# Patient Record
Sex: MALE | Race: WHITE | NOT HISPANIC OR LATINO | Employment: FULL TIME | URBAN - NONMETROPOLITAN AREA
[De-identification: names, ages, dates, MRNs, and addresses within clinical notes are randomized per-mention and may not be internally consistent; named-entity substitution may affect disease eponyms.]

---

## 2023-07-03 ENCOUNTER — OFFICE VISIT (OUTPATIENT)
Dept: INTERNAL MEDICINE | Facility: OTHER | Age: 48
End: 2023-07-03
Payer: COMMERCIAL

## 2023-07-03 ENCOUNTER — PATIENT OUTREACH (OUTPATIENT)
Dept: CARE COORDINATION | Facility: CLINIC | Age: 48
End: 2023-07-03

## 2023-07-03 VITALS
TEMPERATURE: 97.7 F | HEART RATE: 66 BPM | OXYGEN SATURATION: 96 % | DIASTOLIC BLOOD PRESSURE: 82 MMHG | RESPIRATION RATE: 24 BRPM | SYSTOLIC BLOOD PRESSURE: 136 MMHG | WEIGHT: 207.38 LBS | HEIGHT: 71 IN | BODY MASS INDEX: 29.03 KG/M2

## 2023-07-03 DIAGNOSIS — Z00.00 ENCOUNTER FOR MEDICAL EXAMINATION TO ESTABLISH CARE: Primary | ICD-10-CM

## 2023-07-03 DIAGNOSIS — R09.81 NASAL CONGESTION: ICD-10-CM

## 2023-07-03 DIAGNOSIS — E78.2 MIXED HYPERLIPIDEMIA: ICD-10-CM

## 2023-07-03 LAB
ALBUMIN SERPL BCG-MCNC: 4.3 G/DL (ref 3.5–5.2)
ALP SERPL-CCNC: 73 U/L (ref 40–129)
ALT SERPL W P-5'-P-CCNC: 25 U/L (ref 0–70)
ANION GAP SERPL CALCULATED.3IONS-SCNC: 9 MMOL/L (ref 7–15)
AST SERPL W P-5'-P-CCNC: 20 U/L (ref 0–45)
BASOPHILS # BLD AUTO: 0.1 10E3/UL (ref 0–0.2)
BASOPHILS NFR BLD AUTO: 1 %
BILIRUB SERPL-MCNC: 0.4 MG/DL
BUN SERPL-MCNC: 13.7 MG/DL (ref 6–20)
CALCIUM SERPL-MCNC: 9.3 MG/DL (ref 8.6–10)
CHLORIDE SERPL-SCNC: 107 MMOL/L (ref 98–107)
CHOLEST SERPL-MCNC: 214 MG/DL
CREAT SERPL-MCNC: 0.83 MG/DL (ref 0.67–1.17)
DEPRECATED HCO3 PLAS-SCNC: 25 MMOL/L (ref 22–29)
EOSINOPHIL # BLD AUTO: 0.3 10E3/UL (ref 0–0.7)
EOSINOPHIL NFR BLD AUTO: 5 %
ERYTHROCYTE [DISTWIDTH] IN BLOOD BY AUTOMATED COUNT: 12.9 % (ref 10–15)
GFR SERPL CREATININE-BSD FRML MDRD: >90 ML/MIN/1.73M2
GLUCOSE SERPL-MCNC: 111 MG/DL (ref 70–99)
HCT VFR BLD AUTO: 44 % (ref 40–53)
HDLC SERPL-MCNC: 47 MG/DL
HGB BLD-MCNC: 14.9 G/DL (ref 13.3–17.7)
IMM GRANULOCYTES # BLD: 0 10E3/UL
IMM GRANULOCYTES NFR BLD: 1 %
LDLC SERPL CALC-MCNC: 147 MG/DL
LYMPHOCYTES # BLD AUTO: 1.8 10E3/UL (ref 0.8–5.3)
LYMPHOCYTES NFR BLD AUTO: 29 %
MCH RBC QN AUTO: 31.2 PG (ref 26.5–33)
MCHC RBC AUTO-ENTMCNC: 33.9 G/DL (ref 31.5–36.5)
MCV RBC AUTO: 92 FL (ref 78–100)
MONOCYTES # BLD AUTO: 0.5 10E3/UL (ref 0–1.3)
MONOCYTES NFR BLD AUTO: 8 %
NEUTROPHILS # BLD AUTO: 3.6 10E3/UL (ref 1.6–8.3)
NEUTROPHILS NFR BLD AUTO: 56 %
NONHDLC SERPL-MCNC: 167 MG/DL
NRBC # BLD AUTO: 0 10E3/UL
NRBC BLD AUTO-RTO: 0 /100
PLATELET # BLD AUTO: 190 10E3/UL (ref 150–450)
POTASSIUM SERPL-SCNC: 4.7 MMOL/L (ref 3.4–5.3)
PROT SERPL-MCNC: 6.5 G/DL (ref 6.4–8.3)
RBC # BLD AUTO: 4.78 10E6/UL (ref 4.4–5.9)
SODIUM SERPL-SCNC: 141 MMOL/L (ref 136–145)
TRIGL SERPL-MCNC: 102 MG/DL
WBC # BLD AUTO: 6.2 10E3/UL (ref 4–11)

## 2023-07-03 PROCEDURE — 87389 HIV-1 AG W/HIV-1&-2 AB AG IA: CPT | Mod: ZL

## 2023-07-03 PROCEDURE — 36415 COLL VENOUS BLD VENIPUNCTURE: CPT | Mod: ZL

## 2023-07-03 PROCEDURE — 86803 HEPATITIS C AB TEST: CPT | Mod: ZL

## 2023-07-03 PROCEDURE — 80061 LIPID PANEL: CPT | Mod: ZL

## 2023-07-03 PROCEDURE — 85014 HEMATOCRIT: CPT | Mod: ZL

## 2023-07-03 PROCEDURE — 99204 OFFICE O/P NEW MOD 45 MIN: CPT

## 2023-07-03 PROCEDURE — 80053 COMPREHEN METABOLIC PANEL: CPT | Mod: ZL

## 2023-07-03 RX ORDER — ATORVASTATIN CALCIUM 20 MG/1
20 TABLET, FILM COATED ORAL DAILY
Qty: 90 TABLET | Refills: 0 | Status: SHIPPED | OUTPATIENT
Start: 2023-07-03

## 2023-07-03 ASSESSMENT — ENCOUNTER SYMPTOMS
FEVER: 0
SINUS PAIN: 0
CHILLS: 0
SORE THROAT: 0
NERVOUS/ANXIOUS: 1
TROUBLE SWALLOWING: 0
RHINORRHEA: 1
SLEEP DISTURBANCE: 1
PALPITATIONS: 0
DIZZINESS: 0
SHORTNESS OF BREATH: 0
COUGH: 0
SINUS PRESSURE: 0

## 2023-07-03 ASSESSMENT — PATIENT HEALTH QUESTIONNAIRE - PHQ9
SUM OF ALL RESPONSES TO PHQ QUESTIONS 1-9: 16
SUM OF ALL RESPONSES TO PHQ QUESTIONS 1-9: 16

## 2023-07-03 ASSESSMENT — PAIN SCALES - GENERAL: PAINLEVEL: NO PAIN (0)

## 2023-07-03 NOTE — PROGRESS NOTES
Assessment & Plan   Andrade Persaud is a 47 year old presenting for the following health issues:      ICD-10-CM    1. Encounter for medical examination to establish care  Z00.00 Primary Care - Care Coordination Referral     CBC and Differential     Comprehensive Metabolic Panel     Lipid Panel     Hepatitis C Screen Reflex to HCV RNA Quant and Genotype     HIV Antigen Antibody Combo     HIV Antigen Antibody Combo     Hepatitis C Screen Reflex to HCV RNA Quant and Genotype     Lipid Panel     Comprehensive Metabolic Panel     CBC and Differential      2. Nasal congestion  R09.81 Adult ENT  Referral      3. Mixed hyperlipidemia  E78.2 atorvastatin (LIPITOR) 20 MG tablet          MIXED HYPERLIPIDEMIA.  Ongoing. LDL is at goal: No. Triglycerides are at goal: Yes.  Hopefully lifestyle modifications will improve cholesterol levels, otherwise we will need to consider additional medication dose adjustments or medication changes.  Started patient on 20 mg of Lipitor.-Patient was called and notified of the lab results and medication.  Medication side effects reported:N/A    Recent Labs   Lab Test 07/03/23  1018   CHOL 214*   HDL 47   *   TRIG 102        Nasal Congestion: Instructed patient to continue with Flonase nasal spray, Claritin or Zyrtec daily.  Referral been placed to ENT-nasal turbinates are enlarged and swollen with rhinorrhea.  Possible nasal deviation is suspected.  He denies any postnasal drainage, chronic cough, history of allergies.    CBC and CMP are insignificant-recommended to patient to follow-up in 4 to 6 weeks for a yearly physical and to recheck cholesterol levels.    No follow-ups on file.    TORO Deleon CNP  [facility]      Nila Zafar is a 47 year old, presenting for the following health issues:    Patient presents to clinic with ongoing nasal congestion over the last 2 years.  He has been taking multiple different types of nasal sprays,  over-the-counter allergy medications, anticholinergics.  He states that he typically doctors in [place], multiple other places, unable to report exactly where he used to doctor.  He does state that he has a history of cancer, unsure what type of cancer but shows me an incision on his left chest area.  He states that the surgeon said he removed most of the cancer but may need to attempt to get the rest of the cancer otherwise he will need to proceed with chemotherapy.  Patient reports that he forgot to bring in his past medical records.        congestion for years (Nasal and sinus congestion for years since COVID shot. Difficulty sleeping triplett to congestion. Had to put dog down this weekend and is feeling down   Vidhi Allen LPN on 7/3/2023 at 9:36 AM//)  History of Present Illness       Reason for visit:  Congestion, depression    He eats 0-1 servings of fruits and vegetables daily.He consumes 1 sweetened beverage(s) daily.He exercises with enough effort to increase his heart rate 9 or less minutes per day.  He exercises with enough effort to increase his heart rate 3 or less days per week.   He is taking medications regularly.    Today's PHQ-9         PHQ-9 Total Score: 16    PHQ-9 Q9 Thoughts of better off dead/self-harm past 2 weeks :   Not at all           Allergies  Onset/Duration: couple years  Symptoms:   Nasal congestion: YES  Sneezing: No  Red, itchy eyes: No  Progression of Symptoms: same constant  Accompanying Signs & Symptoms:  Cough:No  Wheezing: No  Rash: No  Sinus/facial pain: No  History:   Is it seasonal: during any time of the year   History of Asthma: No  Has allergy testing been done: No  Precipitating factors:   None  Alleviating factors:  None  Therapies tried and outcome: None    Depression Followup    How are you doing with your depression since your last visit? Worsened- due to his dog dying 2 days ago    Are you having other symptoms that might be associated with depression? Yes:   "feeling down since he put his dog down two days ago    Have you had a significant life event?  Grief or Loss     Are you feeling anxious or having panic attacks?   Yes:  at times    Do you have any concerns with your use of alcohol or other drugs? No    Social History     Tobacco Use     Smoking status: Former     Types: Cigarettes, Other     Smokeless tobacco: Current     Tobacco comments:     Smokes marijuana         7/3/2023     9:38 AM   PHQ   PHQ-9 Total Score 16   Q9: Thoughts of better off dead/self-harm past 2 weeks Not at all             Review of Systems   Constitutional: Negative for chills and fever.   HENT: Positive for congestion and rhinorrhea. Negative for ear pain, sinus pressure, sinus pain, sneezing, sore throat, tinnitus and trouble swallowing.    Respiratory: Negative for cough and shortness of breath.    Cardiovascular: Negative for chest pain and palpitations.   Neurological: Negative for dizziness.   Psychiatric/Behavioral: Positive for sleep disturbance. Negative for self-injury and suicidal ideas. The patient is nervous/anxious.    All other systems reviewed and are negative.           Objective    /82   Pulse 66   Temp 97.7  F (36.5  C)   Resp 24   Ht 1.803 m (5' 11\")   Wt 94.1 kg (207 lb 6 oz)   SpO2 96%   BMI 28.92 kg/m    Body mass index is 28.92 kg/m .  Physical Exam  Constitutional:       General: He is not in acute distress.     Appearance: He is well-developed.   HENT:      Head: Normocephalic and atraumatic.      Right Ear: Tympanic membrane, ear canal and external ear normal. There is no impacted cerumen.      Left Ear: Tympanic membrane, ear canal and external ear normal. There is no impacted cerumen.      Nose: Rhinorrhea present.      Right Turbinates: Enlarged and swollen.      Left Turbinates: Enlarged and swollen.      Right Sinus: No maxillary sinus tenderness or frontal sinus tenderness.      Left Sinus: No maxillary sinus tenderness or frontal sinus " tenderness.      Mouth/Throat:      Pharynx: No oropharyngeal exudate.   Eyes:      General: No scleral icterus.     Conjunctiva/sclera: Conjunctivae normal.      Pupils: Pupils are equal, round, and reactive to light.   Neck:      Thyroid: No thyromegaly.   Cardiovascular:      Rate and Rhythm: Normal rate and regular rhythm.      Heart sounds: No murmur heard.  Pulmonary:      Effort: Pulmonary effort is normal. No respiratory distress.      Breath sounds: Normal breath sounds. No wheezing.   Musculoskeletal:         General: No tenderness or deformity. Normal range of motion.      Cervical back: Normal range of motion and neck supple.   Skin:     General: Skin is warm and dry.      Findings: No rash.   Neurological:      Mental Status: He is alert and oriented to person, place, and time.      Cranial Nerves: No cranial nerve deficit.      Coordination: Coordination normal.   Psychiatric:         Behavior: Behavior normal.         Thought Content: Thought content normal.         Judgment: Judgment normal.        Results for orders placed or performed in visit on 07/03/23   Lipid Panel     Status: Abnormal   Result Value Ref Range    Cholesterol 214 (H) <200 mg/dL    Triglycerides 102 <150 mg/dL    Direct Measure HDL 47 >=40 mg/dL    LDL Cholesterol Calculated 147 (H) <=100 mg/dL    Non HDL Cholesterol 167 (H) <130 mg/dL    Narrative    Cholesterol  Desirable:  <200 mg/dL    Triglycerides  Normal:  Less than 150 mg/dL  Borderline High:  150-199 mg/dL  High:  200-499 mg/dL  Very High:  Greater than or equal to 500 mg/dL    Direct Measure HDL  Female:  Greater than or equal to 50 mg/dL   Male:  Greater than or equal to 40 mg/dL    LDL Cholesterol  Desirable:  <100mg/dL  Above Desirable:  100-129 mg/dL   Borderline High:  130-159 mg/dL   High:  160-189 mg/dL   Very High:  >= 190 mg/dL    Non HDL Cholesterol  Desirable:  130 mg/dL  Above Desirable:  130-159 mg/dL  Borderline High:  160-189 mg/dL  High:  190-219  mg/dL  Very High:  Greater than or equal to 220 mg/dL   Comprehensive Metabolic Panel     Status: Abnormal   Result Value Ref Range    Sodium 141 136 - 145 mmol/L    Potassium 4.7 3.4 - 5.3 mmol/L    Chloride 107 98 - 107 mmol/L    Carbon Dioxide (CO2) 25 22 - 29 mmol/L    Anion Gap 9 7 - 15 mmol/L    Urea Nitrogen 13.7 6.0 - 20.0 mg/dL    Creatinine 0.83 0.67 - 1.17 mg/dL    Calcium 9.3 8.6 - 10.0 mg/dL    Glucose 111 (H) 70 - 99 mg/dL    Alkaline Phosphatase 73 40 - 129 U/L    AST 20 0 - 45 U/L    ALT 25 0 - 70 U/L    Protein Total 6.5 6.4 - 8.3 g/dL    Albumin 4.3 3.5 - 5.2 g/dL    Bilirubin Total 0.4 <=1.2 mg/dL    GFR Estimate >90 >60 mL/min/1.73m2   CBC with platelets and differential     Status: None   Result Value Ref Range    WBC Count 6.2 4.0 - 11.0 10e3/uL    RBC Count 4.78 4.40 - 5.90 10e6/uL    Hemoglobin 14.9 13.3 - 17.7 g/dL    Hematocrit 44.0 40.0 - 53.0 %    MCV 92 78 - 100 fL    MCH 31.2 26.5 - 33.0 pg    MCHC 33.9 31.5 - 36.5 g/dL    RDW 12.9 10.0 - 15.0 %    Platelet Count 190 150 - 450 10e3/uL    % Neutrophils 56 %    % Lymphocytes 29 %    % Monocytes 8 %    % Eosinophils 5 %    % Basophils 1 %    % Immature Granulocytes 1 %    NRBCs per 100 WBC 0 <1 /100    Absolute Neutrophils 3.6 1.6 - 8.3 10e3/uL    Absolute Lymphocytes 1.8 0.8 - 5.3 10e3/uL    Absolute Monocytes 0.5 0.0 - 1.3 10e3/uL    Absolute Eosinophils 0.3 0.0 - 0.7 10e3/uL    Absolute Basophils 0.1 0.0 - 0.2 10e3/uL    Absolute Immature Granulocytes 0.0 <=0.4 10e3/uL    Absolute NRBCs 0.0 10e3/uL   CBC and Differential     Status: None    Narrative    The following orders were created for panel order CBC and Differential.  Procedure                               Abnormality         Status                     ---------                               -----------         ------                     CBC with platelets and d...[[id number]]                      Final result                 Please view results for these tests on the individual  orders.

## 2023-07-03 NOTE — PROGRESS NOTES
Clinic Care Coordination Contact    Follow Up Progress Note      Assessment: Patient states he is not sleeping between knee and hip pain and a restless mind. States this has been for a few years. Used to take 4 Trazodone but they left him feeling groggy in the morning and he stopped.     He identifies his biggest challenge as being able to find a doctor who will help him. Was seen at [facility] and now here. He believes he still has cancer and that chemo was recommended. 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 He tells me next week he is back in [place] for a 4-week work stint. States that he has his medication bottle and some medical records there and will call to provide this information.     Care Gaps:    Health Maintenance Due   Topic Date Due     YEARLY PREVENTIVE VISIT  Never done     ADVANCE CARE PLANNING  Never done     HEPATITIS B IMMUNIZATION (1 of 3 - 3-dose series) Never done     COLORECTAL CANCER SCREENING  Never done     DTAP/TDAP/TD IMMUNIZATION (1 - Tdap) Never done     COVID-19 Vaccine (2 - Moderna series) 07/07/2022       Intervention/Education provided during outreach: Care coordination introduced and accepted     Plan:   Patient is going back to work at end of this week. Once there he will call me with names of antidepressant and his doctor who worked with him to address the tumor.   Care Coordinator will wait for him to call next week with more information for follow-up.   Anamaria Contreras RN on 7/6/2023 at 3:10 PM    Clinic Care Coordination Contact  [facility]/Voicemail     Clinical Data: Care Coordinator Outreach  Outreach attempted x 1.  Left message on patient's voicemail with call back information and requested return call.  Plan: Care Coordinator will attempt again.   Anamaria Contreras RN on 7/3/2023 at 2:49 PM

## 2023-07-03 NOTE — PATIENT INSTRUCTIONS
We will coordinate a way to get your health records- I would like to follow up after we have more detailed health history on next steps    Please make a follow up appointment once this is completed.       Referral has been placed with ENT for sinus congestion- please follow up with them after we have your records

## 2023-07-04 LAB
HCV AB SERPL QL IA: NONREACTIVE
HIV 1+2 AB+HIV1 P24 AG SERPL QL IA: NONREACTIVE

## 2023-08-08 ENCOUNTER — PATIENT OUTREACH (OUTPATIENT)
Dept: CARE COORDINATION | Facility: CLINIC | Age: 48
End: 2023-08-08

## 2023-08-08 ENCOUNTER — OFFICE VISIT (OUTPATIENT)
Dept: OTOLARYNGOLOGY | Facility: OTHER | Age: 48
End: 2023-08-08
Attending: OTOLARYNGOLOGY
Payer: COMMERCIAL

## 2023-08-08 DIAGNOSIS — T48.5X5A RHINITIS MEDICAMENTOSA: Primary | ICD-10-CM

## 2023-08-08 DIAGNOSIS — J31.0 RHINITIS MEDICAMENTOSA: Primary | ICD-10-CM

## 2023-08-08 PROCEDURE — G0463 HOSPITAL OUTPT CLINIC VISIT: HCPCS

## 2023-08-08 NOTE — PROGRESS NOTES
Clinic Care Coordination Contact  [facility]/Voicemail       Clinical Data: Care Coordinator Outreach  Outreach attempted x 2.  Left message on patient's voicemail with call back information and requested return call. Patient saw ENT and will return in 3 months. Has not had any care coordination requests or returned my call.     Plan: Care Coordinator will move to maintenance for 2 months and graduate if he doesn't reach out for assistance. Anamaria Contreras RN on 8/15/2023 at 8:48 AM

## 2023-08-10 NOTE — PROGRESS NOTES
document embedded image  Patient Name: Andrade Persaud    Address: [address] [place] [address]     YOB: 1975    [place] [id number]    MR Number: [id number]    Phone: [phone]  PCP: TORO Weems CNP            Appointment Date: 08/08/23   Visit Provider: Chalino Ramirez MD    cc: TORO Weems CNP; ~    ENT Progress Note  Intake  Visit Reasons: Sinus congestion/no films    HPI  History of Present Illness  Chief complaint:  Chronic nasal congestion    History  The patient is a 48-year-old man who comes to the office today for assistance with chronic nasal obstruction that has been for him for several years.  He admits he has been using decongestant nasal sprays daily for least that long.  He states his mother had surgery to correct a chronic nasal obstruction and he would be interested in this as well.  He denies facial pain or purulent nasal.     Exam  Nasal-he is diffusely boggy nasal membranes without polyp formation or purulence.  His septum is slightly but never acting anteriorly.  Remainder of the head and neck exam is unremarkable    Allergies    No Known Allergies Allergy (Verified 08/09/23 11:23)    PFSH  PFSH:     Past Medical History: (Updated 08/09/23 @ 11:23 by Denise Morales, Med Assist)    Headache     Family History: (Updated 08/09/23 @ 11:24 by Denise Morales, Med Assist)  Other  Asthma  Cancer  Diabetes mellitus  Ear pressure  Hearing loss  Hypertension  Tinnitus       Social History: (Updated 08/09/23 @ 11:24 by Denise Morales, Med Assist)  Smoking Status:  Former smoker  second hand exposure:  No  alcohol intake:  former  substance use type:  former substance user       A&P  Assessment & Plan  (1) Rhinitis medicamentosa:        Status: Acute        Code(s):  J31.0 - Chronic rhinitis; T48.5X5A - Adverse effect of other anti-common-cold drugs, initial encounter  The patient was counseled that the 1st step in resolving his obstruction issues as to get him off the  decongestant sprays.  We will place him on a burst and taper of steroid as well as nasal steroid spray and have him stop his decongestant use cold turkey.  I would like to see him in 3 months to assess his nasal airway.                 Medications:  New  fluticasone propionate 50 mcg/actuation     administer into each nostril 2 sprays intranasal DAILY 47.4 grams 3RF      prednisone     40 mg po daily for 7 days, then 30 mg po daily for 2 days, then 20 mg daily for 2 days, then 10 mg daily for 2 days, then d/c 10 mg PO DAILY 40 tabs 0RF                     Chalino Ramirez MD    Filed: 08/09/23 1124    <Electronically signed by Chalino Ramirez MD> 08/09/23 121

## 2023-08-13 ENCOUNTER — HEALTH MAINTENANCE LETTER (OUTPATIENT)
Age: 48
End: 2023-08-13

## 2024-10-06 ENCOUNTER — HEALTH MAINTENANCE LETTER (OUTPATIENT)
Age: 49
End: 2024-10-06

## 2025-04-02 ENCOUNTER — HOSPITAL ENCOUNTER (EMERGENCY)
Facility: OTHER | Age: 50
Discharge: HOME | End: 2025-04-02
Attending: PHYSICIAN ASSISTANT

## 2025-04-02 VITALS
BODY MASS INDEX: 29.4 KG/M2 | DIASTOLIC BLOOD PRESSURE: 92 MMHG | HEIGHT: 71 IN | OXYGEN SATURATION: 96 % | TEMPERATURE: 97.1 F | RESPIRATION RATE: 17 BRPM | WEIGHT: 210 LBS | HEART RATE: 75 BPM | SYSTOLIC BLOOD PRESSURE: 154 MMHG

## 2025-04-02 DIAGNOSIS — R39.198 DIFFICULTY URINATING: ICD-10-CM

## 2025-04-02 PROCEDURE — 99284 EMERGENCY DEPT VISIT MOD MDM: CPT | Performed by: PHYSICIAN ASSISTANT

## 2025-04-02 PROCEDURE — 99283 EMERGENCY DEPT VISIT LOW MDM: CPT | Performed by: PHYSICIAN ASSISTANT

## 2025-04-02 RX ORDER — TAMSULOSIN HYDROCHLORIDE 0.4 MG/1
CAPSULE ORAL
COMMUNITY
End: 2025-04-02

## 2025-04-02 RX ORDER — TAMSULOSIN HYDROCHLORIDE 0.4 MG/1
0.4 CAPSULE ORAL DAILY
Qty: 30 CAPSULE | Refills: 0 | Status: SHIPPED | OUTPATIENT
Start: 2025-04-02 | End: 2025-05-02

## 2025-04-02 ASSESSMENT — ENCOUNTER SYMPTOMS
ABDOMINAL PAIN: 0
HEMATURIA: 0
CHILLS: 0
FEVER: 0
DIFFICULTY URINATING: 1

## 2025-04-02 ASSESSMENT — COLUMBIA-SUICIDE SEVERITY RATING SCALE - C-SSRS
6. HAVE YOU EVER DONE ANYTHING, STARTED TO DO ANYTHING, OR PREPARED TO DO ANYTHING TO END YOUR LIFE?: NO
1. IN THE PAST MONTH, HAVE YOU WISHED YOU WERE DEAD OR WISHED YOU COULD GO TO SLEEP AND NOT WAKE UP?: NO
2. HAVE YOU ACTUALLY HAD ANY THOUGHTS OF KILLING YOURSELF IN THE PAST MONTH?: NO

## 2025-04-02 ASSESSMENT — ACTIVITIES OF DAILY LIVING (ADL): ADLS_ACUITY_SCORE: 41

## 2025-04-02 NOTE — DISCHARGE INSTRUCTIONS
Get plenty of fluids and rest.  We discussed further options today including obtaining lab work, bladder scans, urine studies however was decided to continue with just medication at this point due to insurance issues.  I did attach some information discussing benign prostatic hyperplasia which could be a process that is occurring with  you, however, sometimes there can be worsening conditions that are causing this as well.  You should return if you are having worsening difficulty with urinating, increased abdominal pain, increased fevers or in general becoming more ill.  I did place a referral for you to follow-up with urology as an outpatient.

## 2025-04-02 NOTE — ED TRIAGE NOTES
Pt here via private car.  Pt states that he ran out of his tamsulosin and now has been retaining urine for the past 2 days.  Pt's medication does not have a refill and the pharmacy will not refill it  pt is also having some discomfort due to retaining urine.

## 2025-04-02 NOTE — ED PROVIDER NOTES
"  History     Chief Complaint   Patient presents with    Urinary Retention     HPI  Andrade Persaud is a 49 year old male who presents to the ED for evaluation of urinary retention. Pt here via private car. Pt states that he ran out of his tamsulosin and now has been retaining urine for the past 2 days. Pt's medication does not have a refill and the pharmacy will not refill it pt is also having some discomfort due to retaining urine.     Allergies:  No Known Allergies    Problem List:    Patient Active Problem List    Diagnosis Date Noted    Mixed hyperlipidemia 07/03/2023     Priority: Medium        Past Medical History:    No past medical history on file.    Past Surgical History:    No past surgical history on file.    Family History:    No family history on file.    Social History:  Marital Status:  Single [1]  Social History     Tobacco Use    Smoking status: Former     Types: Cigarettes, Other    Smokeless tobacco: Current    Tobacco comments:     Smokes marijuana        Medications:    tamsulosin (FLOMAX) 0.4 MG capsule  atorvastatin (LIPITOR) 20 MG tablet          Review of Systems   Constitutional:  Negative for chills and fever.   Gastrointestinal:  Negative for abdominal pain.   Genitourinary:  Positive for decreased urine volume and difficulty urinating. Negative for hematuria, scrotal swelling and testicular pain.       Physical Exam   BP: (!) 154/92  Pulse: 75  Temp: 97.1  F (36.2  C)  Resp: 17  Height: 180.3 cm (5' 11\")  Weight: 95.3 kg (210 lb)  SpO2: 96 %      Physical Exam  Constitutional:       General: He is not in acute distress.     Appearance: He is well-developed. He is not ill-appearing or diaphoretic.   HENT:      Head: Normocephalic and atraumatic.   Cardiovascular:      Rate and Rhythm: Normal rate and regular rhythm.   Pulmonary:      Effort: Pulmonary effort is normal.      Breath sounds: Normal breath sounds.   Abdominal:      Palpations: Abdomen is soft.      Tenderness: There is no " abdominal tenderness.   Neurological:      Mental Status: He is alert. Mental status is at baseline.   Psychiatric:         Mood and Affect: Mood normal.         Behavior: Behavior normal.         ED Course        Procedures              Critical Care time:  none              No results found for this or any previous visit (from the past 24 hours).    Medications - No data to display    Assessments & Plan (with Medical Decision Making)   Pt nontoxic in NAD. Heart, lung, bowel sounds normal, abd soft, nontender to palpation, nondistended. VSS, afebrile.     He does report a chronic hx of difficulty urinating and that usually he takes flomax and his sx's resolve. [place] renew this medication.     He denies any fevers, abdominal pain, nausea or vomiting.    I discussed my concerns for him this evening including urine retention, outlet obstruction, infection etc.  I did offer him further studies including lab work, imaging, bladder scans and if needed indwelling Johnson catheter.  He is aware of my concerns but declined all further studies at this time it sounds like due to insurance issues.  He was provided insurance information.  He decided that he would just like to be given a renewal of his Flomax and that he will return if not improving.  I will go ahead and place a referral for him to follow-up with urology in the clinic hopefully by that time his insurance issues are fixed.    Strict return precautions are given to the pt, they will return if symptoms are worsening or concerning. The pt understands and agrees with the plan and they are discharged.     Kole Golden PA-C    I have reviewed the nursing notes.    I have reviewed the findings, diagnosis, plan and need for follow up with the patient.          Discharge Medication List as of 4/2/2025 12:19 PM          Final diagnoses:   Difficulty urinating       4/2/2025   [facility]        Kole Golden PA  04/02/25 [id number]

## 2025-04-07 DIAGNOSIS — R39.198 DIFFICULTY URINATING: Primary | ICD-10-CM

## 2025-04-10 ENCOUNTER — OFFICE VISIT (OUTPATIENT)
Dept: UROLOGY | Facility: OTHER | Age: 50
End: 2025-04-10
Attending: PHYSICIAN ASSISTANT

## 2025-04-10 ENCOUNTER — LAB (OUTPATIENT)
Dept: LAB | Facility: OTHER | Age: 50
End: 2025-04-10

## 2025-04-10 VITALS
HEART RATE: 72 BPM | WEIGHT: 210 LBS | TEMPERATURE: 97.7 F | OXYGEN SATURATION: 94 % | HEIGHT: 71 IN | BODY MASS INDEX: 29.4 KG/M2 | RESPIRATION RATE: 20 BRPM

## 2025-04-10 DIAGNOSIS — R45.86 MOOD CHANGES: ICD-10-CM

## 2025-04-10 DIAGNOSIS — R39.198 DIFFICULTY URINATING: ICD-10-CM

## 2025-04-10 DIAGNOSIS — G47.00 INSOMNIA, UNSPECIFIED TYPE: ICD-10-CM

## 2025-04-10 DIAGNOSIS — R39.16 BENIGN PROSTATIC HYPERPLASIA (BPH) WITH STRAINING ON URINATION: Primary | ICD-10-CM

## 2025-04-10 DIAGNOSIS — N40.1 BENIGN PROSTATIC HYPERPLASIA (BPH) WITH STRAINING ON URINATION: Primary | ICD-10-CM

## 2025-04-10 LAB
ALBUMIN UR-MCNC: NEGATIVE MG/DL
APPEARANCE UR: CLEAR
BILIRUB UR QL STRIP: NEGATIVE
COLOR UR AUTO: ABNORMAL
GLUCOSE UR STRIP-MCNC: NEGATIVE MG/DL
HGB UR QL STRIP: NEGATIVE
KETONES UR STRIP-MCNC: NEGATIVE MG/DL
LEUKOCYTE ESTERASE UR QL STRIP: ABNORMAL
NITRATE UR QL: NEGATIVE
PH UR STRIP: 5.5 [PH] (ref 5–9)
SP GR UR STRIP: 1.02 (ref 1–1.03)
UROBILINOGEN UR STRIP-MCNC: NORMAL MG/DL

## 2025-04-10 PROCEDURE — 81003 URINALYSIS AUTO W/O SCOPE: CPT | Mod: ZL

## 2025-04-10 PROCEDURE — 51798 US URINE CAPACITY MEASURE: CPT

## 2025-04-10 RX ORDER — TAMSULOSIN HYDROCHLORIDE 0.4 MG/1
0.4 CAPSULE ORAL DAILY
Qty: 30 CAPSULE | Refills: 11 | Status: SHIPPED | OUTPATIENT
Start: 2025-04-10 | End: 2026-04-10

## 2025-04-10 NOTE — NURSING NOTE
"Chief Complaint   Patient presents with    Consult    Urinary Retention       Initial Pulse 72   Temp 97.7  F (36.5  C) (Tympanic)   Resp 20   Ht 1.803 m (5' 11\")   Wt 95.3 kg (210 lb)   SpO2 94%   BMI 29.29 kg/m   Estimated body mass index is 29.29 kg/m  as calculated from the following:    Height as of this encounter: 1.803 m (5' 11\").    Weight as of this encounter: 95.3 kg (210 lb).  Medication Reconciliation: complete      AUA- 30  post void residual -61ml    Kerrie Mccarthy LPN    "

## 2025-04-10 NOTE — PROGRESS NOTES
Chief Complaint: Consult and Urinary Retention  .    HPI: Mr. Andrade Persaud is a 49 year old year old male with a history of mixed hyperlipidemia who presents today April 10, 2025 for evaluation of difficulty urinating.    Patient was seen in the emergency department on 4/2/2025, he had run out of his tamsulosin and felt that he was retaining urine for 2 days prior.    Patient reports weak stream, intermittent flow, this is significantly better with daily dose of flomax. Patient voids every 4-5 hours during the day and is getting up 1-2 times during the night. Denies leakage. Denies urinary urgency, urinary frequency, and dribbling. These symptoms started 1.5 years ago. Tamsulosin makes symptoms better and does not identify that makes symptoms worse.    Patient drinks 2 liters of water per day, and half gallon of cranberry juice. Patient consumes 18 servings of caffeine in the form of  coffee per day. With the last beverage at 4 pm.  Drinks no alcohol-containing beverages. Does typically consume spicy foods. Bowel movement daily with no difficulty passing. Does have occasional constipation, Doculax gummies as needed for constipation.  Patient reports that he drinks excessive amounts of caffeine because he may go 2 to 3 days without sleeping.  He also notes some changes in his mood over the last 1 to 2 years, he is less able to redirect his moods.  He reports he has never had formal evaluation for the symptoms.      History reviewed. No pertinent past medical history.    History reviewed. No pertinent surgical history.    FAMILY HISTORY: Denies a family history of bladder, kidney, or bladder cancer.      SOCIAL HISTORY:    reports that he has quit smoking. His smoking use included cigarettes and other. He uses smokeless tobacco.    Current Outpatient Medications   Medication Sig Dispense Refill    tamsulosin (FLOMAX) 0.4 MG capsule Take 1 capsule (0.4 mg) by mouth daily. 30 capsule 11    tamsulosin (FLOMAX) 0.4 MG  "capsule Take 1 capsule (0.4 mg) by mouth daily. 30 capsule 0    atorvastatin (LIPITOR) 20 MG tablet Take 1 tablet (20 mg) by mouth daily (Patient not taking: Reported on 4/10/2025) 90 tablet 0       ALLERGIES: Patient has no known allergies.     GENERAL PHYSICAL EXAM:   Vitals: Pulse 72   Temp 97.7  F (36.5  C) (Tympanic)   Resp 20   Ht 1.803 m (5' 11\")   Wt 95.3 kg (210 lb)   SpO2 94%   BMI 29.29 kg/m    Body mass index is 29.29 kg/m .    GENERAL: Well groomed, well developed, well nourished male in NAD.  HEENT:  Normal   CV:  Warm extremities   RESPIRATORY: Normal respiratory effort.    GI: Soft, NT, ND,  MS: Moving all four  NEURO: Alert and oriented x 3.  PSYCH: Somewhat anxious during exam, responds appropriately.    :  Prostate: Enlarged approximately 50 ml, symmetrical, normal consistency, nontender, no nodules appreciated.    PVR: Residual urine by ultrasound was 61 ml.           RADIOLOGY: The following tests were reviewed: None available.    LABS: The last test results for Ms. Andrade Persaud were reviewed.   Results for orders placed or performed in visit on 04/10/25 (from the past 24 hours)   Urinalysis Macroscopic   Result Value Ref Range    Color Urine Light Yellow Colorless, Straw, Light Yellow, Yellow    Appearance Urine Clear Clear    Glucose Urine Negative Negative mg/dL    Bilirubin Urine Negative Negative    Ketones Urine Negative Negative mg/dL    Specific Gravity Urine 1.020 1.000 - 1.030    Blood Urine Negative Negative    pH Urine 5.5 5.0 - 9.0    Protein Albumin Urine Negative Negative mg/dL    Urobilinogen Urine Normal Normal mg/dL    Nitrite Urine Negative Negative    Leukocyte Esterase Urine Small (A) Negative       PSA - No results found for: \"PSA\"  BMP -   Recent Labs   Lab Test 07/03/23  1018      POTASSIUM 4.7   CHLORIDE 107   CO2 25   BUN 13.7   CR 0.83   *   KIMMY 9.3       CBC -   Recent Labs   Lab Test 07/03/23  1018   WBC 6.2   HGB 14.9    "       ASSESSMENT:   Urinary retention in the presence of BPH after running out of tamsulosin.  Patient was restarted on tamsulosin in the emergency department and symptoms have improved.  Although he has been using Flomax as needed versus daily.  Patient drinks copious amounts of caffeine due to insomnia.    PLAN:   1. Difficulty urinating  Patient is no longer having difficulty with urination while on regular dose of tamsulosin 0.4mg. I provided him with one year of refills.   - Adult Urology  Referral  - MEASUREMENT, POST-VOIDING RESIDUAL URINE &/OR BLADDER CAPACITY, US, NON-IMAGING    2. Insomnia, unspecified type   Patient has significant insomnia that is contributing to the excessive amounts of caffeine that he is drinking. We discussed sleep hygiene measures, eliminating caffeine after noon, getting regular activity to promote sleep. If these adjustments to habits are not helpful I recommended he follow up with primary care and/or discuss with mental health professional.  - Adult Mental Health  Referral; Future    3. Mood changes  Patient reports that changes in his moods, feeling consistently irritated and upset. He has not previously had a diagnostic assessment and is interested in pursuing this once he can secure insurance.  - Adult Mental Health  Referral; Future    4. Benign prostatic hyperplasia (BPH) with straining on urination  Discussion with patient about Benign Prostatic Hypertophy and enlargement of the male prostate with aging.  Explained the incidence of BPH which occurs in 40% of 40-year olds, 50% of 50-year olds, 60% of 60-year olds, etc.    Explained the importance of PSA testing and digital examination of the prostate (DINESH).      Explained to patient that other conditions may mimic BPH including excessive caffeine consumption, alcohol consumption, constipation, spicy and acidic foods, stress, diabetes, obesity, and neurologic reasons such as Multiple Sclerosis and  Parkinson's Disease.     Recommended behavioral therapy with reduction/elimination of caffeine and alcohol, certain foods/spices, avoidance of constipation and all cold and pain medications.      Discussed the use of alpha blockers such as tamsulosin. Side effects of alpha blockers discussed including retrograde ejaculation, ED, dizziness upon standing, fatigue and nasal congestion.    Discussed finasteride and dutasteride which work by reducing the size of the prostate over 6 to 12 months.  Side effects including diminished libido(centrally), erectile dysfunction, breast tenderness and a < 0.5% risk of developing high grade prostate cancer.     Finally discussed surgical therapies such as TURP, TUIP, Holep, Urolift, Rezum and Robotic assisted Simple Prostatectomy and their associated risks and benefits.      Patient voiced an understanding. He has significant symptom improvement with flomax so we will continue this. I recommended he significantly reduce his caffeine intake, prevent constipation, and limit heavily seasoned/spicy foods as able. Very mild retention today with no history of UTI. Six month follow up.    - tamsulosin (FLOMAX) 0.4 MG capsule; Take 1 capsule (0.4 mg) by mouth daily.  Dispense: 30 capsule; Refill: 11      28 minutes spent on the date of this encounter doing chart review, history and exam, documentation and further activities as noted above.      TORO Hester [facility]

## 2025-04-10 NOTE — PATIENT INSTRUCTIONS
Drink plenty of fluids, > 2 liters/day  Avoid constipation.  Consider Miralax Over the counter, metamucil or Citrucel with increased fiber in your diet  Limit bladder irritants including alcohol, caffeine, and heavily seasoned foods.  Consider double-voiding with the second void in the tripod position.   Continue with tamsulosin 0.4 mg daily.  Limit caffeine after noon.  Follow up in six months.   8.  Follow up for diagnostic assessment.

## 2025-05-24 ENCOUNTER — APPOINTMENT (OUTPATIENT)
Dept: GENERAL RADIOLOGY | Facility: OTHER | Age: 50
End: 2025-05-24

## 2025-05-24 ENCOUNTER — HOSPITAL ENCOUNTER (EMERGENCY)
Facility: OTHER | Age: 50
Discharge: HOME | End: 2025-05-24

## 2025-05-24 VITALS
DIASTOLIC BLOOD PRESSURE: 80 MMHG | BODY MASS INDEX: 29.4 KG/M2 | TEMPERATURE: 97.6 F | WEIGHT: 210 LBS | OXYGEN SATURATION: 97 % | SYSTOLIC BLOOD PRESSURE: 137 MMHG | RESPIRATION RATE: 16 BRPM | HEART RATE: 69 BPM | HEIGHT: 71 IN

## 2025-05-24 DIAGNOSIS — M79.641 PAIN OF RIGHT HAND: ICD-10-CM

## 2025-05-24 PROCEDURE — 99284 EMERGENCY DEPT VISIT MOD MDM: CPT

## 2025-05-24 PROCEDURE — 250N000011 HC RX IP 250 OP 636: Mod: JZ

## 2025-05-24 PROCEDURE — 73130 X-RAY EXAM OF HAND: CPT | Mod: 26 | Performed by: RADIOLOGY

## 2025-05-24 PROCEDURE — 99283 EMERGENCY DEPT VISIT LOW MDM: CPT

## 2025-05-24 PROCEDURE — 96372 THER/PROPH/DIAG INJ SC/IM: CPT

## 2025-05-24 PROCEDURE — 73130 X-RAY EXAM OF HAND: CPT | Mod: RT

## 2025-05-24 RX ORDER — KETOROLAC TROMETHAMINE 30 MG/ML
30 INJECTION, SOLUTION INTRAMUSCULAR; INTRAVENOUS ONCE
Status: COMPLETED | OUTPATIENT
Start: 2025-05-24 | End: 2025-05-24

## 2025-05-24 RX ADMIN — KETOROLAC TROMETHAMINE 30 MG: 30 INJECTION, SOLUTION INTRAMUSCULAR at 14:53

## 2025-05-24 ASSESSMENT — COLUMBIA-SUICIDE SEVERITY RATING SCALE - C-SSRS
1. IN THE PAST MONTH, HAVE YOU WISHED YOU WERE DEAD OR WISHED YOU COULD GO TO SLEEP AND NOT WAKE UP?: NO
6. HAVE YOU EVER DONE ANYTHING, STARTED TO DO ANYTHING, OR PREPARED TO DO ANYTHING TO END YOUR LIFE?: NO
2. HAVE YOU ACTUALLY HAD ANY THOUGHTS OF KILLING YOURSELF IN THE PAST MONTH?: NO

## 2025-05-24 ASSESSMENT — ACTIVITIES OF DAILY LIVING (ADL): ADLS_ACUITY_SCORE: 41

## 2025-05-24 ASSESSMENT — ENCOUNTER SYMPTOMS: ARTHRALGIAS: 1

## 2025-05-24 NOTE — ED TRIAGE NOTES
Pt here by himself with c/o injuring his right hand at work over a week ago, pt reports having problems bending his hand, VSS, pt into bay [facility] ambulatory   Triage Assessment (Adult)       Row Name 05/24/25 1433          Triage Assessment    Airway WDL WDL        Respiratory WDL    Respiratory WDL WDL        Peripheral/Neurovascular WDL    Peripheral Neurovascular WDL WDL

## 2025-05-24 NOTE — ED PROVIDER NOTES
"  History     Chief Complaint   Patient presents with    Hand Injury     HPI  Andrade Persaud is a 49 year old male with right hand pain. He reports approximately 14 days ago while at work he used his right hand to pound shut a 5 gallon pail of paint. He reports he instantly felt pain in his hand after he hit the pail. The pain has worsened over the past 3-4 days. Full ROM of his right hand. Slight tingling in his right little finger. Swelling to lateral aspect of right hand.     Allergies:  No Known Allergies    Problem List:    Patient Active Problem List    Diagnosis Date Noted    Mixed hyperlipidemia 07/03/2023     Priority: Medium        Past Medical History:    No past medical history on file.    Past Surgical History:    No past surgical history on file.    Family History:    No family history on file.    Social History:  Marital Status:  Single [1]  Social History     Tobacco Use    Smoking status: Former     Types: Cigarettes, Other    Smokeless tobacco: Current    Tobacco comments:     Smokes marijuana   Vaping Use    Vaping status: Never Used        Medications:    atorvastatin (LIPITOR) 20 MG tablet  tamsulosin (FLOMAX) 0.4 MG capsule          Review of Systems   Musculoskeletal:  Positive for arthralgias.   All other systems reviewed and are negative.      Physical Exam   BP: 137/80  Pulse: 69  Temp: 97.6  F (36.4  C)  Resp: 16  Height: 180.3 cm (5' 11\")  Weight: 95.3 kg (210 lb)  SpO2: 97 %      Physical Exam  Vitals and nursing note reviewed.   Constitutional:       General: He is not in acute distress.     Appearance: Normal appearance. He is not ill-appearing.   HENT:      Head: Normocephalic.   Musculoskeletal:      Right hand: Swelling and tenderness present. Normal sensation. Normal capillary refill. Normal pulse.      Comments: Pain on palpation to lateral aspect of right hand. Mild amount of swelling noted to the hand. No erythema. No warmth. Full ROM of right hand. 5/5 strength with flexion " "and extension of PIP and DIP joint of 5th digit. Cap refill <2 sec. Sensation intact.    Neurological:      General: No focal deficit present.      Mental Status: He is alert and oriented to person, place, and time.      GCS: GCS eye subscore is 4. GCS verbal subscore is 5. GCS motor subscore is 6.   Psychiatric:         Mood and Affect: Mood normal.            Results for orders placed or performed during the hospital encounter of 05/24/25 (from the past 24 hours)   XR Hand Port Right G/E 3 Views    Narrative    EXAM: XR HAND PORT RIGHT G/E 3 VIEWS  LOCATION: [facility]  DATE: 5/24/2025    INDICATION: Used his right hand to close a 5lb pail of paint. Pain along lateral aspect up into 5th digit.  COMPARISON: None.      Impression    IMPRESSION: No evidence of an acute displaced fracture with attention to the fifth finger. No significant joint space narrowing.       Medications   ketorolac (TORADOL) injection 30 mg (30 mg Intramuscular $Given 5/24/25 4480)       Assessments & Plan (with Medical Decision Making)  Andrade Persaud is a 49 year old male with right hand pain. He reports approximately 14 days ago while at work he used his right hand to pound shut a 5 gallon pail of paint. He reports he instantly felt pain in his hand after he hit the pail. The pain has worsened over the past 3-4 days. Full ROM of his right hand. Slight tingling in his right little finger. Swelling to lateral aspect of right hand.   VS in the ED. /80   Pulse 69   Temp 97.6  F (36.4  C) (Tympanic)   Resp 16   Ht 1.803 m (5' 11\")   Wt 95.3 kg (210 lb)   SpO2 97%   BMI 29.29 kg/m    Diagnostics:    X-ray: XR right hand- No evidence of an acute displaced fracture with attention to the fifth finger. No significant joint space narrowing.     Andrade is a 50 y/o male evaluated today for right hand pain after using the lateral aspect of his hand to pound shut a lid on a paint bucket. Xray reveals no evidence of an " acute fracture. No redness or warmth. He is afebrile. No open areas. Less likely infectious. I placed an orthopedic referral. Discussed return to ED precautions. Verbalizes understanding of discharge plan.      I have reviewed the nursing notes.    I have reviewed the findings, diagnosis, plan and need for follow up with the patient.  Medical Decision Making  The patient's presentation was of low complexity (an acute and uncomplicated illness or injury).    The patient's evaluation involved:  an assessment requiring an independent historian (see separate area of note for details)  ordering and/or review of 1 test(s) in this encounter (see separate area of note for details)    The patient's management necessitated moderate risk (prescription drug management including medications given in the ED).    Final diagnoses:   Pain of right hand     5/24/2025   [facility] IslandCayla, APRN CNP  05/24/25 1530

## 2025-05-24 NOTE — DISCHARGE INSTRUCTIONS
As discussed no fracture seen on the x-ray. I placed an orthopedic referral. Please call [phone] to schedule your appointment. Apply ice to the area for 10-15 minutes every couple of hours for comfort. Tylenol and ibuprofen as needed for pain.     Please return to the emergency room if you experience worsening pain, increased swelling, redness, fever, or any new or worsening symptoms.

## (undated) RX ORDER — KETOROLAC TROMETHAMINE 30 MG/ML
INJECTION, SOLUTION INTRAMUSCULAR; INTRAVENOUS
Status: DISPENSED
Start: 2025-05-24